# Patient Record
Sex: FEMALE | Race: WHITE | Employment: OTHER | URBAN - METROPOLITAN AREA
[De-identification: names, ages, dates, MRNs, and addresses within clinical notes are randomized per-mention and may not be internally consistent; named-entity substitution may affect disease eponyms.]

---

## 2017-06-28 ENCOUNTER — HOSPITAL ENCOUNTER (OUTPATIENT)
Dept: PHYSICAL THERAPY | Age: 62
Discharge: HOME | End: 2017-06-30
Attending: FAMILY MEDICINE | Admitting: FAMILY MEDICINE

## 2017-07-01 ENCOUNTER — HOSPITAL ENCOUNTER (OUTPATIENT)
Dept: OTHER | Age: 62
Discharge: HOME | End: 2017-07-31
Attending: FAMILY MEDICINE | Admitting: FAMILY MEDICINE

## 2017-07-05 ENCOUNTER — HOSPITAL ENCOUNTER (OUTPATIENT)
Dept: PHYSICAL THERAPY | Age: 62
Discharge: HOME | End: 2017-07-05
Admitting: FAMILY MEDICINE

## 2017-07-21 ENCOUNTER — HOSPITAL ENCOUNTER (OUTPATIENT)
Dept: PHYSICAL THERAPY | Age: 62
Discharge: HOME | End: 2017-07-21
Admitting: FAMILY MEDICINE

## 2017-08-01 ENCOUNTER — HOSPITAL ENCOUNTER (OUTPATIENT)
Dept: OTHER | Age: 62
Discharge: HOME | End: 2017-08-04
Attending: FAMILY MEDICINE | Admitting: FAMILY MEDICINE

## 2017-08-04 ENCOUNTER — HOSPITAL ENCOUNTER (OUTPATIENT)
Dept: PHYSICAL THERAPY | Age: 62
Discharge: HOME | End: 2017-08-04
Admitting: FAMILY MEDICINE

## 2018-11-02 ENCOUNTER — HOSPITAL ENCOUNTER (OUTPATIENT)
Dept: NEUROLOGY | Age: 63
Discharge: HOME | End: 2018-11-02
Payer: COMMERCIAL

## 2018-11-02 PROCEDURE — 95861 NEEDLE EMG 2 EXTREMITIES: CPT

## 2020-09-28 PROBLEM — R60.0 LOCALIZED EDEMA: Status: ACTIVE | Noted: 2020-09-28

## 2020-09-28 PROBLEM — N18.30 CKD (CHRONIC KIDNEY DISEASE), STAGE III (HCC): Status: ACTIVE | Noted: 2020-09-28

## 2020-12-24 PROBLEM — N17.8 OTHER ACUTE KIDNEY FAILURE (HCC): Status: ACTIVE | Noted: 2020-12-24

## 2020-12-24 PROBLEM — F34.1 DYSTHYMIC DISORDER: Status: ACTIVE | Noted: 2020-12-24

## 2021-01-25 PROBLEM — N18.30 CKD (CHRONIC KIDNEY DISEASE), STAGE III (HCC): Status: RESOLVED | Noted: 2020-09-28 | Resolved: 2021-01-25

## 2021-01-25 PROBLEM — N18.31 STAGE 3A CHRONIC KIDNEY DISEASE (HCC): Status: ACTIVE | Noted: 2020-09-28

## 2021-06-01 ENCOUNTER — HOSPITAL ENCOUNTER (OUTPATIENT)
Dept: GENERAL RADIOLOGY | Age: 66
Discharge: HOME | End: 2021-06-01
Payer: MEDICARE

## 2021-06-01 DIAGNOSIS — E13.10 DIABETIC KETOSIS WITHOUT COMA (HCC): ICD-10-CM

## 2021-06-01 PROCEDURE — 74220 X-RAY XM ESOPHAGUS 1CNTRST: CPT

## 2021-08-05 ENCOUNTER — INITIAL CONSULT (OUTPATIENT)
Dept: PULMONOLOGY | Age: 66
End: 2021-08-05
Payer: MEDICARE

## 2021-08-05 VITALS
OXYGEN SATURATION: 96 % | WEIGHT: 293 LBS | HEIGHT: 66 IN | BODY MASS INDEX: 47.09 KG/M2 | DIASTOLIC BLOOD PRESSURE: 80 MMHG | SYSTOLIC BLOOD PRESSURE: 128 MMHG | HEART RATE: 88 BPM

## 2021-08-05 DIAGNOSIS — R06.02 SOBOE (SHORTNESS OF BREATH ON EXERTION): Primary | ICD-10-CM

## 2021-08-05 DIAGNOSIS — Z00.00 HEALTHCARE MAINTENANCE: ICD-10-CM

## 2021-08-05 DIAGNOSIS — E66.01 MORBID OBESITY (HCC): ICD-10-CM

## 2021-08-05 DIAGNOSIS — R05.9 COUGH: ICD-10-CM

## 2021-08-05 PROCEDURE — 99204 OFFICE O/P NEW MOD 45 MIN: CPT | Performed by: NURSE PRACTITIONER

## 2021-08-05 ASSESSMENT — ASTHMA QUESTIONNAIRES
QUESTION_1 LAST FOUR WEEKS HOW MUCH OF THE TIME DID YOUR ASTHMA KEEP YOU FROM GETTING AS MUCH DONE AT WORK, SCHOOL OR AT HOME: 3
QUESTION_4 LAST FOUR WEEKS HOW OFTEN HAVE YOU USED YOUR RESCUE INHALER OR NEBULIZER MEDICATION (SUCH AS ALBUTEROL): 4
QUESTION_2 LAST FOUR WEEKS HOW OFTEN HAVE YOU HAD SHORTNESS OF BREATH: 1
QUESTION_3 LAST FOUR WEEKS HOW OFTEN DID YOUR ASTHMA SYMPTOMS (WHEEZING, COUGHING, SHORTNESS OF BREATH, CHEST TIGHTNESS OR PAIN) WAKE YOU UP AT NIGHT OR EARLIER THAN USUAL IN THE MORNING: 4
ACT_TOTALSCORE: 15
QUESTION_5 LAST FOUR WEEKS HOW WOULD YOU RATE YOUR ASTHMA CONTROL: 3

## 2021-08-05 ASSESSMENT — SLEEP AND FATIGUE QUESTIONNAIRES
HOW LIKELY ARE YOU TO NOD OFF OR FALL ASLEEP WHILE SITTING AND TALKING TO SOMEONE: 0
NECK CIRCUMFERENCE (INCHES): 17
HOW LIKELY ARE YOU TO NOD OFF OR FALL ASLEEP WHILE LYING DOWN TO REST IN THE AFTERNOON WHEN CIRCUMSTANCES PERMIT: 2
HOW LIKELY ARE YOU TO NOD OFF OR FALL ASLEEP WHILE SITTING QUIETLY AFTER LUNCH WITHOUT ALCOHOL: 3
HOW LIKELY ARE YOU TO NOD OFF OR FALL ASLEEP WHILE SITTING INACTIVE IN A PUBLIC PLACE: 0
ESS TOTAL SCORE: 10
HOW LIKELY ARE YOU TO NOD OFF OR FALL ASLEEP WHILE SITTING AND READING: 2
HOW LIKELY ARE YOU TO NOD OFF OR FALL ASLEEP WHILE WATCHING TV: 2
HOW LIKELY ARE YOU TO NOD OFF OR FALL ASLEEP WHEN YOU ARE A PASSENGER IN A CAR FOR AN HOUR WITHOUT A BREAK: 1
HOW LIKELY ARE YOU TO NOD OFF OR FALL ASLEEP IN A CAR, WHILE STOPPED FOR A FEW MINUTES IN TRAFFIC: 0

## 2021-08-05 NOTE — PROGRESS NOTES
Subjective:   CHIEF COMPLAINT / HPI:       Ravindra Rossi is a 78 y.o. female with history of asthma, SOB, GERD, CHRIS, presents to pulmonary clinic for evaluation and management of asthma and shortness of breath. Dov Bose states she was told she had asthma many years ago. She states she saw Dr. Avni Torres in January who completed pulmonary function test along with other allergen test and felt that she did not present as asthmatic. She is currently on Symbicort 2 puffs twice a day with spacer. She had tried Advair discus and Breo in the past which she failed on those therapies. She received a sample of Trelegy from Dr. Avni Torres and stated that she felt she had the most relief with Trelegy. She states that she was using her albuterol rescue inhaler 1 - 2 times a week last fall, but has been using several times a day for the past 3 - 4 months. She also has been prescribed Flonase which she uses 1 spray each naris daily. She has had no recent chest x-ray or CT of chest.    She states she has SOB with walking up stairs and inclines. She states on rare occasion she may have a wheeze. She will have occasional cough, her cough sometimes is worsened. She is currently on Prilosec for reflux along with Pepcid. Dov Bose states they are practicing social distancing, wearing a mask when out in public, washing hands frequently or using hand . Denies any fever, chills, malaise, change in sensation of taste or smell, headache or lightheadedness. Denies any known contacts with persons with respiratory infection, positive for coronavirus or under investigation for possible coronavirus exposure.     Influenza immunization: 10/21/2020  Pneumococcal immunization:Prenvar 13 8/28/2019, 9/8/2017;  PPV23 on 9/18/2017  COVID-19 immunization: 3/11/2021, 2/10/2021 - [facility]  Smoking history: never smoked, heavy 2nd hand exposure going up,  PCP: Destin Solano MD    Past Medical History:  Past Medical History:   Diagnosis Date    Arthritis     Asthma     Diabetes mellitus ([facility] [address]     Hypertension        Current Medications:      Current Outpatient Medications:     buPROPion (WELLBUTRIN XL) 150 MG extended release tablet, Take 150 mg by mouth every morning, Disp: , Rfl:     verapamil (VERELAN) 240 MG extended release capsule, Take 1 capsule by mouth nightly, Disp: 30 capsule, Rfl: 5    furosemide (LASIX) 40 MG tablet, Take 1 tablet by mouth 2 times daily (Patient taking differently: Take 60 mg by mouth daily ), Disp: 180 tablet, Rfl: 3    hydrALAZINE (APRESOLINE) 25 MG tablet, Take 25 mg by mouth 2 times daily, Disp: , Rfl:     valsartan (DIOVAN) 320 MG tablet, Take 320 mg by mouth daily, Disp: , Rfl:     FLUoxetine (PROZAC) 20 MG capsule, Take 20 mg by mouth daily, Disp: , Rfl:     Meloxicam 15 MG TBDP, Take 1 tablet by mouth every other day, Disp: , Rfl:     MULTIPLE VITAMIN PO, Take by mouth, Disp: , Rfl:     Cholecalciferol (VITAMIN D) 50 MCG (2000 UT) CAPS capsule, Take by mouth, Disp: , Rfl:     Ascorbic Acid (VITAMIN C) 250 MG tablet, Take 250 mg by mouth daily, Disp: , Rfl:     vitamin E 1000 units capsule, Take 1,000 Units by mouth daily, Disp: , Rfl:     melatonin 10 MG CAPS capsule, Take 10 mg by mouth nightly, Disp: , Rfl:     aspirin 81 MG chewable tablet, Take 1 tablet by mouth daily, Disp: 30 tablet, Rfl: 3    SITagliptin (JANUVIA) 100 MG tablet, Take 100 mg by mouth daily, Disp: , Rfl:     allopurinol (ZYLOPRIM) 100 MG tablet, Take 200 mg by mouth daily , Disp: , Rfl:     omeprazole (PRILOSEC) 40 MG delayed release capsule, Take 40 mg by mouth daily , Disp: , Rfl:     traMADol (ULTRAM) 50 MG tablet, Take 100 mg by mouth every 8 hours as needed for Pain. , Disp: , Rfl:     levothyroxine (SYNTHROID) 75 MCG tablet, Take 75 mcg by mouth Daily, Disp: , Rfl:     Budesonide-Formoterol Fumarate (SYMBICORT IN), Inhale into the lungs, Disp: , Rfl:     fenofibrate (TRICOR) 48 MG tablet, Take 48 mg by mouth daily, (Medical):  Lack of Transportation (Non-Medical):    Physical Activity:     Days of Exercise per Week:     Minutes of Exercise per Session:    Stress:     Feeling of Stress :    Social Connections:     Frequency of Communication with Friends and Family:     Frequency of Social Gatherings with Friends and Family:     Attends [employer] Services:     Active Member of Clubs or Organizations:     Attends Club or Organization Meetings:     Marital Status:    Intimate Partner Violence:     Fear of Current or Ex-Partner:     Emotionally Abused:     Physically Abused:     Sexually Abused:        Family History:    Family History   Problem Relation Age of Onset    Heart Disease Mother     COPD Mother     Diabetes Mother     Kidney Disease Mother     Hypertension Mother     Heart Disease Father     COPD Father     Cancer Father     High Cholesterol Father     Hypertension Father     Diabetes Maternal Grandmother     Diabetes Maternal Grandfather     Kidney Disease Paternal Grandmother     Kidney Disease Paternal Grandfather          REVIEW OF SYSTEMS:    CONSTITUTIONAL:  Negative for fevers, chills, diaphoresis, activity change, appetite change, night sweats, unexpected weight change.    HEENT:  Negative for hearing loss,  sinus pressure, nasal congestion, epistaxis and snoring  RESPIRATORY: Positive shortness of breath, positive cough-nonproductive, positive rare wheeze  CARDIOVASCULAR:  Negative for chest pain, palpitations, exertional chest pressure/discomfort, edema, syncope  GASTROINTESTINAL: Negative for nausea, vomiting, diarrhea, constipation, blood in stool and abdominal pain  GENITOURINARY:  Negative for frequency, dysuria and hematuria  HEMATOLOGIC/LYMPHATIC:  Negative for easy bruising, bleeding and lymphadenopathy  ALLERGIC/IMMUNOLOGIC:  Negative for recurrent infections, angioedema, anaphylaxis and drug reaction  MUSCULOSKELETAL:  Negative for  pain, joint swelling, decreased range of motion and muscle weakness  NEURO: Negative for headache, AMS, decrease sensations  SKIN: Negative for rashes or lesions      Objective:   VITALS:   Vitals:    08/05/21 1417   BP: 128/80   Pulse: 88   SpO2: 96%   Weight: (!) 301 lb (136.5 kg)   Height: 5' 6\" (1.676 m)     Neck circumference: 17  Inches  [place] - Total score: 10  MALLAMPATI: 3  Body mass index is 48.58 kg/m². PHYSICAL EXAM:    CONSTITUTIONAL:  awake, alert, cooperative, no apparent distress, and appears stated age  HEENT:  Supple and nontender,  trachea midline, no adenopathy, thyroid normal, no JVD, no wheezing or stridor over neck  CHEST: Chest expansion equal and symmetrical, no intercostal retraction, no increased work of breathing  LUNGS: Bilateral breath sounds clear and equal to auscultation, good air movement, no use of accessory muscles to support respiratory effort. No forced expiratory wheeze, no rales, no rhonchi, no cough noted during assessment. O2 sat at rest on room air is 96%  CARDIOVASCULAR: Normal S1 and S2 , no murmurs or gallops ,no pericardial rubs  ABDOMEN:  normal bowel sounds, non-distended and no masses palpated, and no tenderness to palpation. LYMPHADENOPATHY:  no axillary or supraclavicular adenopathy. No cervical adenopathy  EXTREMITIES: No edema, no inflammation, no tenderness, no clubbing of digits  NEURO: Oriented X 3, No focal deficits  SKIN: warm and dry      RADIOLOGY:  No recent chest CT or chest x-ray to review    PFT:   Spirometry was completed at Dr. Victory Pallas office I will request results from his office to review    Assessment      1. SOBOE (shortness of breath on exertion)    2. Cough    3. Morbid obesity ([address]    4. Healthcare maintenance        Plan:  Keke shortness of breath with activity could be multifactorial, she has been seen by Dr. Sonia Bauer who does not feel that she is asthmatic. He did complete allergy testing she stated and she was not allergic to tested items.   She has been sent to this office note. Any errors should be brought immediately to my attention for correction. All efforts were made to ensure that this office note is accurate.        Electronically signed by JIMBO Ruelas CNP on 8/5/2021 at 8:18 PM

## 2021-11-11 ENCOUNTER — OFFICE VISIT (OUTPATIENT)
Dept: PULMONOLOGY | Age: 66
End: 2021-11-11
Payer: MEDICARE

## 2021-11-11 VITALS
DIASTOLIC BLOOD PRESSURE: 70 MMHG | OXYGEN SATURATION: 98 % | WEIGHT: 293 LBS | SYSTOLIC BLOOD PRESSURE: 124 MMHG | HEART RATE: 75 BPM | HEIGHT: 66 IN | BODY MASS INDEX: 47.09 KG/M2

## 2021-11-11 DIAGNOSIS — E66.01 MORBID OBESITY (HCC): ICD-10-CM

## 2021-11-11 DIAGNOSIS — Z00.00 HEALTHCARE MAINTENANCE: ICD-10-CM

## 2021-11-11 DIAGNOSIS — J98.4 RESTRICTIVE LUNG DISEASE SECONDARY TO OBESITY: ICD-10-CM

## 2021-11-11 DIAGNOSIS — E66.9 RESTRICTIVE LUNG DISEASE SECONDARY TO OBESITY: ICD-10-CM

## 2021-11-11 DIAGNOSIS — R06.02 SOBOE (SHORTNESS OF BREATH ON EXERTION): Primary | ICD-10-CM

## 2021-11-11 PROCEDURE — 99214 OFFICE O/P EST MOD 30 MIN: CPT | Performed by: NURSE PRACTITIONER

## 2021-11-11 RX ORDER — BUDESONIDE, GLYCOPYRROLATE, AND FORMOTEROL FUMARATE 160; 9; 4.8 UG/1; UG/1; UG/1
2 AEROSOL, METERED RESPIRATORY (INHALATION) 2 TIMES DAILY
Qty: 1 EACH | Refills: 5 | Status: SHIPPED | OUTPATIENT
Start: 2021-11-11 | End: 2021-12-11

## 2021-11-11 NOTE — PROGRESS NOTES
Pulmonary Clinic Office Follow up     Doug Bey is a 78 y.o. female with history of asthma, shortness of breath, GERD, CHRIS, presents today to the pulmonary clinic for 3-month follow up. Christina Ortiz states she has not required hospitalization, visits to the emergency room, walk-in clinic, urgent care for respiratory related illness since last visit when seen in consult on 8/5/2021. Since Keke's consult we have received notes from Dr. Luciano Vogt whom she saw on 12/31/2020 who recommended at that time that she have a complete pulmonary function test, CT chest.  She did complete a office spirometry at his office which demonstrated questionable restrictive lung disease. He started her on Trelegy and gave her a sample however her insurance would not cover Trelegy. She is currently on Symbicort using it twice a day along with albuterol rescue inhaler which she states she is using 2-3 times a week. She states when she was on the Trelegy she was not requiring her albuterol rescue inhaler at all and did feel that she had significant improvement in her overall lung function. She is also currently on Flonase which she uses daily 1 spray each naris. She states her shortness of breath has improved slightly but she also admits to not being active. She is unable to walk stairs or inclines due to physical restrictions. She is in a electric mobile wheel unit which he states she does not use in her home but she has limited distance that she needs to walk in her own home are very limited distance. Keke's weight is a contributing factor to her shortness of breath and possible restrictive lung disease. She has lost 3 pounds since I saw her 3 months ago. We have discussed the importance of weight loss and physical activity even though she is limited in her walking ability.   We have also discussed the importance of lung exercises and she is to perform lung exercises as demonstrated in the office 4 times a day. Cici Carpenter states they are practicing social distancing, wearing a mask when out in public, washing hands frequently or using hand . Denies any fever, chills, malaise, change in sensation of taste or smell, headache or lightheadedness. Denies any known contacts with persons with respiratory infection, positive for coronavirus or under investigation for possible coronavirus exposure. Influenza immunization: 10/14/2021  Pneumococcal immunization: Prevnar 13 on 8/28/2019 and 9/8/2017, PPV 23 on 9/18/2017  COVID-19 immunization:  11/5/2021, 3/11/2021, 2/10/2020- Moderna  Smoking history: Non-smoker, nonvapor, none use of smokeless tobacco products  PCP: Danna Estrada MD    Past Medical History:  Past Medical History:   Diagnosis Date    Arthritis     Asthma     Diabetes mellitus ([facility] [address]     Hypertension        Current medications and allergies have been reviewed    Social and family history unchanged from initial consult      REVIEW OF SYSTEMS:    CONSTITUTIONAL:  negative for fevers, chills, diaphoresis, activity change, appetite change, night sweats and unexpected weight change.    HEENT:  Negative for hearing loss, sinus pressure, nasal congestion, epistaxis, snoring  RESPIRATORY: Positive shortness of breath on exertion, negative cough, negative wheeze  CARDIOVASCULAR:  Negative for chest pain, palpitations, exertional chest pressure/discomfort, edema, syncope  GASTROINTESTINAL: Negative for nausea, vomiting, diarrhea, constipation, blood in stool and abdominal pain  GENITOURINARY:  Negative for frequency, dysuria and hematuria  HEMATOLOGIC/LYMPHATIC:  Negative for easy bruising, bleeding and lymphadenopathy  ALLERGIC/IMMUNOLOGIC:  Negative for recurrent infections, angioedema, anaphylaxis and drug reaction  MUSCULOSKELETAL:  Negative for pain, joint swelling, decreased range of motion and muscle weakness  NEURO: Negative for headache, AMS, decrease sensations  SKIN: Negative for rashes or lesions      Physical Exam:  /70   Pulse 75   Ht 5' 6\" (1.676 m)   Wt 298 lb 6.4 oz (135.4 kg)   SpO2 98%   BMI 48.16 kg/m²    Body mass index is 48.16 kg/m². Constitutional:  She appears well developed and well-nourished. Neck:  Supple, no palpable lymphadenopathy, no JVD  Cardiovascular:  S1, S2 Normal, Regular rhythm, no murmurs or gallops, no pericardial  rubs. Pulmonary:  Bilateral breath sounds clear and equal to auscultation, good air movement, no use of accessory muscles to support respiratory effort. No forced expiratory wheeze, no rales, no rhonchi, no cough noted during assessment. O2 sat at rest on room air is 98%   Abdomen: No epigastric pain, no distention, + bowel sounds   Extremities: No edema, no calf tenderness, no clubbing of digits  Neurologic:  Awake and alert, no focal deficits  Skin: warm and dry    Radiology:   CT chest scan pending    PFT:   Completed at Dr. Omar Monroe office on 12/31/2020 demonstrated possible restrictive activity, FENO was 21. PFT pending    ASSESSMENT/PLAN:  1. SOBOE (shortness of breath on exertion)    2. Restrictive lung disease secondary to obesity    3. Morbid obesity ([address]    4.  Healthcare maintenance      --Continue Breztri, denies need for refills, is aware that depending upon work-up findings bronchodilator therapy may change as triple therapy is not needed for restrictive lung disease however if she has an obstructive component depending upon lung function we may continue current bronchodilator therapy  --Continue albuterol rescue inhaler as needed, denies need for refills at this time  --Pulmonary function test to be completed in next few weeks  --Covid prescreening test will be completed prior to pulmonary function test  --CT chest in next few weeks  --While she has been vaccinated including her booster at this time for COVID-19, I strongly recommend that she continue Coronavirus precaution including: Wearing mask when in public and when in contact with persons who have not been immunized, social distancing when needing to be in public, handwashing practice, wiping items touched in public such as gas pumps, door handles, shopping carts, etc.  --Recommend yearly flu vaccination, is up-to-date for this season  --Recommend Pneumovax vaccination, is up-to-date at this time  --I will continue to follow in pulmonary clinic  Return in about 3 months (around 2/11/2022) for 3-month Doxy to discuss CT, PFT, 6-month follow-up in office. This dictation was performed with a verbal recognition program and it was checked for errors. It is possible that there are still dictated errors within this office note. Any errors should be brought immediately to my attention for correction. All efforts were made to ensure that this office note is accurate.

## 2021-12-28 DIAGNOSIS — Z01.811 PREOP PULMONARY/RESPIRATORY EXAM: Primary | ICD-10-CM

## 2022-01-21 ENCOUNTER — HOSPITAL ENCOUNTER (OUTPATIENT)
Dept: LAB | Age: 67
Discharge: HOME | End: 2022-01-21
Payer: MEDICARE

## 2022-01-21 LAB
SARS-COV-2: DETECTED
SOURCE: ABNORMAL

## 2022-01-21 PROCEDURE — U0003 INFECTIOUS AGENT DETECTION BY NUCLEIC ACID (DNA OR RNA); SEVERE ACUTE RESPIRATORY SYNDROME CORONAVIRUS 2 (SARS-COV-2) (CORONAVIRUS DISEASE [COVID-19]), AMPLIFIED PROBE TECHNIQUE, MAKING USE OF HIGH THROUGHPUT TECHNOLOGIES AS DESCRIBED BY CMS-2020-01-R: HCPCS

## 2022-01-21 PROCEDURE — 36415 COLL VENOUS BLD VENIPUNCTURE: CPT

## 2022-01-21 PROCEDURE — U0005 INFEC AGEN DETEC AMPLI PROBE: HCPCS

## 2022-01-24 ENCOUNTER — TELEPHONE (OUTPATIENT)
Dept: PULMONOLOGY | Age: 67
End: 2022-01-24

## 2022-01-24 NOTE — TELEPHONE ENCOUNTER
Patient called and advised that she is positive and needs to [place] pft I advised she would speak to cs.

## 2022-01-25 DIAGNOSIS — Z01.811 PREOP PULMONARY/RESPIRATORY EXAM: Primary | ICD-10-CM

## 2022-01-26 ENCOUNTER — HOSPITAL ENCOUNTER (OUTPATIENT)
Dept: PULMONOLOGY | Age: 67
Discharge: HOME | End: 2022-01-26

## 2022-02-16 ENCOUNTER — HOSPITAL ENCOUNTER (OUTPATIENT)
Dept: LAB | Age: 67
Discharge: HOME | End: 2022-02-16
Payer: MEDICARE

## 2022-02-16 LAB
SARS-COV-2: NOT DETECTED
SOURCE: NORMAL

## 2022-02-16 PROCEDURE — U0005 INFEC AGEN DETEC AMPLI PROBE: HCPCS

## 2022-02-16 PROCEDURE — U0003 INFECTIOUS AGENT DETECTION BY NUCLEIC ACID (DNA OR RNA); SEVERE ACUTE RESPIRATORY SYNDROME CORONAVIRUS 2 (SARS-COV-2) (CORONAVIRUS DISEASE [COVID-19]), AMPLIFIED PROBE TECHNIQUE, MAKING USE OF HIGH THROUGHPUT TECHNOLOGIES AS DESCRIBED BY CMS-2020-01-R: HCPCS

## 2022-02-21 ENCOUNTER — HOSPITAL ENCOUNTER (OUTPATIENT)
Dept: PULMONOLOGY | Age: 67
Discharge: HOME | End: 2022-02-21
Payer: MEDICARE

## 2022-02-21 DIAGNOSIS — R06.02 SOBOE (SHORTNESS OF BREATH ON EXERTION): ICD-10-CM

## 2022-02-21 LAB
DLCO %PRED: 81 %
DLCO PRED: NORMAL
DLCO/VA %PRED: NORMAL
DLCO/VA PRED: NORMAL
DLCO/VA: NORMAL
DLCO: NORMAL
EXPIRATORY TIME-POST: NORMAL
EXPIRATORY TIME: NORMAL
FEF 25-75% %CHNG: NORMAL
FEF 25-75% %PRED-POST: NORMAL
FEF 25-75% %PRED-PRE: NORMAL
FEF 25-75% PRED: NORMAL
FEF 25-75%-POST: NORMAL
FEF 25-75%-PRE: NORMAL
FEV1 %PRED-POST: 90 %
FEV1 %PRED-PRE: 87 %
FEV1 PRED: NORMAL
FEV1-POST: NORMAL
FEV1-PRE: NORMAL
FEV1/FVC %PRED-POST: NORMAL
FEV1/FVC %PRED-PRE: NORMAL
FEV1/FVC PRED: NORMAL
FEV1/FVC-POST: 102 %
FEV1/FVC-PRE: 102 %
FVC %PRED-POST: NORMAL
FVC %PRED-PRE: NORMAL
FVC PRED: NORMAL
FVC-POST: NORMAL
FVC-PRE: NORMAL
GAW %PRED: NORMAL
GAW PRED: NORMAL
GAW: NORMAL
IC %PRED: NORMAL
IC PRED: NORMAL
IC: NORMAL
MEP: NORMAL
MIP: NORMAL
MVV %PRED-PRE: NORMAL
MVV PRED: NORMAL
MVV-PRE: NORMAL
PEF %PRED-POST: NORMAL
PEF %PRED-PRE: NORMAL
PEF PRED: NORMAL
PEF%CHNG: NORMAL
PEF-POST: NORMAL
PEF-PRE: NORMAL
RAW %PRED: NORMAL
RAW PRED: NORMAL
RAW: NORMAL
RV %PRED: NORMAL
RV PRED: NORMAL
RV: NORMAL
SVC %PRED: NORMAL
SVC PRED: NORMAL
SVC: NORMAL
TLC %PRED: 86 %
TLC PRED: NORMAL
TLC: NORMAL
VA %PRED: NORMAL
VA PRED: NORMAL
VA: NORMAL
VTG %PRED: NORMAL
VTG PRED: NORMAL
VTG: NORMAL

## 2022-02-21 PROCEDURE — 94727 GAS DIL/WSHOT DETER LNG VOL: CPT

## 2022-02-21 PROCEDURE — 94060 EVALUATION OF WHEEZING: CPT

## 2022-02-21 PROCEDURE — 94729 DIFFUSING CAPACITY: CPT

## 2022-02-21 ASSESSMENT — PULMONARY FUNCTION TESTS
FEV1_PERCENT_PREDICTED_POST: 90
FEV1/FVC_PRE: 102
FEV1_PERCENT_PREDICTED_PRE: 87
FEV1/FVC_POST: 102

## 2022-03-14 LAB
CREATININE URINE: 25.1 MG/DL
PROT/CREAT RATIO, UR: NORMAL RATIO (ref 15–220)
TOTAL PROTEIN, URINE: <4 MG/DL

## 2022-03-15 PROBLEM — E11.22 TYPE 2 DIABETES MELLITUS WITH STAGE 3B CHRONIC KIDNEY DISEASE (HCC): Status: ACTIVE | Noted: 2022-03-15

## 2022-03-15 PROBLEM — N18.32 TYPE 2 DIABETES MELLITUS WITH STAGE 3B CHRONIC KIDNEY DISEASE (HCC): Status: ACTIVE | Noted: 2022-03-15

## 2022-03-15 PROBLEM — N18.31 STAGE 3A CHRONIC KIDNEY DISEASE (HCC): Status: RESOLVED | Noted: 2020-09-28 | Resolved: 2022-03-15

## 2022-03-15 LAB
ALBUMIN: 4.2 G/DL (ref 3.5–5.2)
BUN BLDV-MCNC: 25 MG/DL (ref 3–29)
BUN/CREAT BLD: 18 (ref 7–25)
CALCIUM SERPL-MCNC: 9.8 MG/DL (ref 8.5–10.5)
CHLORIDE BLD-SCNC: 98 MEQ/L (ref 96–110)
CO2: 25 MEQ/L (ref 19–32)
CREAT SERPL-MCNC: 1.4 MG/DL (ref 0.5–1.2)
FASTING STATUS: ABNORMAL
GLOMERULAR FILTRATION RATE: 41 MLS/MIN/1.73M2
GLUCOSE BLD-MCNC: 248 MG/DL (ref 70–99)
PHOSPHORUS: 3.9 MG/DL (ref 2.1–4.3)
POTASSIUM SERPL-SCNC: 4.5 MEQ/L (ref 3.4–5.3)
SODIUM BLD-SCNC: 138 MEQ/L (ref 135–148)

## 2023-01-12 ENCOUNTER — OFFICE VISIT (OUTPATIENT)
Dept: PULMONOLOGY | Age: 68
End: 2023-01-12

## 2023-01-12 VITALS
OXYGEN SATURATION: 98 % | HEIGHT: 66 IN | BODY MASS INDEX: 47.09 KG/M2 | SYSTOLIC BLOOD PRESSURE: 126 MMHG | DIASTOLIC BLOOD PRESSURE: 74 MMHG | HEART RATE: 86 BPM | RESPIRATION RATE: 16 BRPM | WEIGHT: 293 LBS

## 2023-01-12 DIAGNOSIS — J44.9 COPD MIXED TYPE (HCC): Primary | ICD-10-CM

## 2023-01-12 DIAGNOSIS — J44.1 ASTHMA EXACERBATION WITH COPD (CHRONIC OBSTRUCTIVE PULMONARY DISEASE) (HCC): ICD-10-CM

## 2023-01-12 DIAGNOSIS — J45.901 ASTHMA EXACERBATION WITH COPD (CHRONIC OBSTRUCTIVE PULMONARY DISEASE) (HCC): ICD-10-CM

## 2023-01-12 RX ORDER — BUDESONIDE, GLYCOPYRROLATE, AND FORMOTEROL FUMARATE 160; 9; 4.8 UG/1; UG/1; UG/1
160 AEROSOL, METERED RESPIRATORY (INHALATION) 2 TIMES DAILY
Qty: 3 EACH | Refills: 3 | Status: SHIPPED | OUTPATIENT
Start: 2023-01-12 | End: 2023-04-12

## 2023-01-12 ASSESSMENT — ENCOUNTER SYMPTOMS
GASTROINTESTINAL NEGATIVE: 1
SHORTNESS OF BREATH: 1
EYES NEGATIVE: 1

## 2023-01-12 NOTE — PROGRESS NOTES
Eli Stuart (:  1955) is a 75 y.o. female,Established patient, here for evaluation of the following chief complaint(s):  Follow-up and Shortness of Breath      Subjective   SUBJECTIVE/OBJECTIVE:  Eli Stuart is a 75 y.o. female with history of asthma, shortness of breath, GERD, CHRIS, presents today to the pulmonary clinic for follow up to discuss PFT results and to receive a new prescription for [facility] inhaler. Danae Never states that she had asthma attacks in her younger years as a teenager. She would be playing tennis or exercising and then have to stop because she could not breath. She states that she doesn't smoke, but that her father was a heavy smoker and he smoked around her. She has a short haired cat, in which she states that she has not always had a cat, but that her breathing isn't any different with or without the cat. She denies being allergic to most environmental fumes. She remembers going bowling and people would smoke around her. She is a retired teacher. Danae Never stated that she knows her weight is a contributing factor to her shortness of breath and possible restrictive lung disease. We discussed taking two different inhaler that she was prescribed. She states that she was first prescribed Symbicort and that it worked well at first. She was prescribed Breztri when the Symbicort stopped helping her shortness of breath. She ran out of [facility] and had Symbicort on hand. She used the Symbicort because that is all she had at the time. She reports that [facility] works better for her. She reports having bronchospasms and a frequent dry cough. We discussed that she may need that extra bronchodilator support that a muscarinic can give to help reduce bronchospasms and cough. I reordered the [facility]. The flow volume loop tracing appeared to present with mild COPD. We discussed the results of the PFT which demonstrates a normal DLCO with normal spirometry.  We discussed that she possible have exercise induce Asthma with mild COPD in which she states that she has been told that before. Last chest xray was on 08/11/2017. I ordered a chest Xray to compare if any changes in her lungs. XRAY findings: Mild cardiomegaly and central vascular congestion. No overt failure or focal.   infiltrate. She is currently up to date on Covid-19 vaccines and flu vaccine. Shortness of Breath      Review of Systems   Constitutional: Negative. HENT: Negative. Eyes: Negative. Respiratory:  Positive for shortness of breath. Cardiovascular: Negative. Gastrointestinal: Negative. Endocrine: Negative. Genitourinary: Negative. Neurological: Negative. Hematological: Negative. Psychiatric/Behavioral:  Positive for sleep disturbance. Objective   Physical Exam  Vitals and nursing note reviewed. Constitutional:       General: She is awake. Appearance: Normal appearance. She is well-developed and well-groomed. HENT:      Head: Normocephalic. Nose: Nose normal.      Mouth/Throat:      Mouth: Mucous membranes are moist.      Pharynx: Oropharynx is clear. Eyes:      Extraocular Movements: Extraocular movements intact. Pupils: Pupils are equal, round, and reactive to light. Cardiovascular:      Rate and Rhythm: Normal rate and regular rhythm. Pulses: Normal pulses. Heart sounds: Normal heart sounds. Pulmonary:      Effort: Pulmonary effort is normal.      Breath sounds: Normal breath sounds. Abdominal:      Palpations: Abdomen is soft. Musculoskeletal:         General: Normal range of motion. Skin:     General: Skin is warm and dry. Capillary Refill: Capillary refill takes less than 2 seconds. Neurological:      General: No focal deficit present. Mental Status: She is alert. Psychiatric:         Mood and Affect: Mood normal.         Behavior: Behavior normal. Behavior is cooperative. Thought Content:  Thought content normal. Judgment: Judgment normal.         ASSESSMENT/PLAN:  1. COPD mixed type ([facility] [address]  -     Budeson-Glycopyrrol-Formoterol (BREZTRI AEROSPHERE) 160-9-4.8 MCG/ACT AERO; Inhale 160 mcg into the lungs in the morning and at bedtime, Disp-3 each, R-3Normal  -     XR CHEST (2 VW); Future  2. Asthma exacerbation with COPD (chronic obstructive pulmonary disease) ([facility] [address]    --Breztri reordered. Sabi Dahl will stop taking Symbicort and continue to use Breztri. She will call the clinic when refills are needed. --Continue wearing CPAP. --Continue to use Albuterol rescue inhaler when needed. --Chest Xray ordered     Vital Signs   /74   Pulse 86   Resp 16   Ht 5' 6\" (1.676 m)   Wt 295 lb (133.8 kg)   SpO2 98%   BMI 47.61 kg/m²      Return in about 6 months (around 7/12/2023). Sleep Medicine 8/5/2021   Sitting and reading 2   Watching TV 2   Sitting, inactive in a public place (e.g. a theatre or a meeting) 0   As a passenger in a car for an hour without a break 1   Lying down to rest in the afternoon when circumstances permit 2   Sitting and talking to someone 0   Sitting quietly after a lunch without alcohol 3   In a car, while stopped for a few minutes in traffic 0   [place] Sleepiness Score 10   Neck circumference (Inches) 17     Mallampati score 4     Sabi Dahl is currently on CPAP. I discussed with Sabi Dahl to follow up with her DME to get the settings of her CPAP machine. This way she will know what settings to use should she have admission to hospital. Sabi Dahl stated that she knows not to use Symbicort and Breztri together and that she only used the Symbicort because she had some on hand and had ran out of [employer]point. She verbally stated understanding of our discussion. I will make no changes in her treatment at this time. She is in agreement with the plan. I will continue to follow Sabi Dahl in pulmonary clinic. An electronic signature was used to authenticate this note.     --Felipa Garza, JIMBO - CNP

## 2023-07-11 ENCOUNTER — OFFICE VISIT (OUTPATIENT)
Dept: PULMONOLOGY | Age: 68
End: 2023-07-11
Payer: MEDICARE

## 2023-07-11 VITALS
RESPIRATION RATE: 16 BRPM | HEIGHT: 67 IN | SYSTOLIC BLOOD PRESSURE: 110 MMHG | DIASTOLIC BLOOD PRESSURE: 60 MMHG | OXYGEN SATURATION: 99 % | HEART RATE: 77 BPM | BODY MASS INDEX: 45.99 KG/M2 | WEIGHT: 293 LBS

## 2023-07-11 DIAGNOSIS — J44.9 COPD MIXED TYPE (HCC): Primary | ICD-10-CM

## 2023-07-11 DIAGNOSIS — J45.901 ASTHMA EXACERBATION WITH COPD (CHRONIC OBSTRUCTIVE PULMONARY DISEASE) (HCC): ICD-10-CM

## 2023-07-11 DIAGNOSIS — J44.1 ASTHMA EXACERBATION WITH COPD (CHRONIC OBSTRUCTIVE PULMONARY DISEASE) (HCC): ICD-10-CM

## 2023-07-11 PROCEDURE — 3078F DIAST BP <80 MM HG: CPT | Performed by: NURSE PRACTITIONER

## 2023-07-11 PROCEDURE — 3074F SYST BP LT 130 MM HG: CPT | Performed by: NURSE PRACTITIONER

## 2023-07-11 PROCEDURE — 1123F ACP DISCUSS/DSCN MKR DOCD: CPT | Performed by: NURSE PRACTITIONER

## 2023-07-11 PROCEDURE — 99213 OFFICE O/P EST LOW 20 MIN: CPT | Performed by: NURSE PRACTITIONER

## 2023-07-11 ASSESSMENT — COPD QUESTIONNAIRES: COPD: 1

## 2024-04-24 PROBLEM — N18.32 STAGE 3B CHRONIC KIDNEY DISEASE (CKD) (HCC): Status: ACTIVE | Noted: 2024-04-24

## 2024-07-09 ENCOUNTER — OFFICE VISIT (OUTPATIENT)
Dept: PULMONOLOGY | Age: 69
End: 2024-07-09
Payer: MEDICARE

## 2024-07-09 VITALS
HEART RATE: 84 BPM | DIASTOLIC BLOOD PRESSURE: 74 MMHG | BODY MASS INDEX: 44.23 KG/M2 | SYSTOLIC BLOOD PRESSURE: 136 MMHG | OXYGEN SATURATION: 98 % | WEIGHT: 281.8 LBS | HEIGHT: 67 IN

## 2024-07-09 DIAGNOSIS — J45.901 ASTHMA EXACERBATION WITH COPD (CHRONIC OBSTRUCTIVE PULMONARY DISEASE) (HCC): Primary | ICD-10-CM

## 2024-07-09 DIAGNOSIS — J44.1 ASTHMA EXACERBATION WITH COPD (CHRONIC OBSTRUCTIVE PULMONARY DISEASE) (HCC): Primary | ICD-10-CM

## 2024-07-09 PROCEDURE — 3075F SYST BP GE 130 - 139MM HG: CPT | Performed by: NURSE PRACTITIONER

## 2024-07-09 PROCEDURE — 1123F ACP DISCUSS/DSCN MKR DOCD: CPT | Performed by: NURSE PRACTITIONER

## 2024-07-09 PROCEDURE — 3078F DIAST BP <80 MM HG: CPT | Performed by: NURSE PRACTITIONER

## 2024-07-09 PROCEDURE — 99214 OFFICE O/P EST MOD 30 MIN: CPT | Performed by: NURSE PRACTITIONER

## 2024-07-09 RX ORDER — FLUTICASONE FUROATE, UMECLIDINIUM BROMIDE AND VILANTEROL TRIFENATATE 200; 62.5; 25 UG/1; UG/1; UG/1
1 POWDER RESPIRATORY (INHALATION) DAILY
Qty: 1 EACH | Refills: 11 | Status: SHIPPED | OUTPATIENT
Start: 2024-07-09

## 2024-07-09 RX ORDER — ALBUTEROL SULFATE 90 UG/1
2 AEROSOL, METERED RESPIRATORY (INHALATION) EVERY 6 HOURS PRN
Qty: 18 G | Refills: 6 | Status: SHIPPED | OUTPATIENT
Start: 2024-07-09

## 2024-07-09 RX ORDER — ALBUTEROL SULFATE 90 UG/1
2 AEROSOL, METERED RESPIRATORY (INHALATION) EVERY 6 HOURS PRN
COMMUNITY
Start: 2022-12-30 | End: 2024-07-09 | Stop reason: SDUPTHER

## 2024-07-09 RX ORDER — MECLIZINE HYDROCHLORIDE 25 MG/1
12.5-25 TABLET ORAL 3 TIMES DAILY PRN
COMMUNITY
Start: 2023-11-13

## 2024-07-09 NOTE — PROGRESS NOTES
Keke Escalante (:  1955) is a 68 y.o. female,Established patient, here for evaluation of the following chief complaint(s):  Follow-up (1yr f/u need rx for trelegy. )    Subjective   SUBJECTIVE/OBJECTIVE:  Keke Escalante is a 68 y.o. female with history of asthma, shortness of breath, GERD, CHRIS, presents today to the pulmonary clinic for follow up on efficacy of Breztri and medication refills. She states that Breztri is working okay, but insurance stop covering the cost. I am prescribing Trelegy 200 mcg to take once daily. Rinse mouth out. She was seen by her PCP at [facility] on 2024 to r/o pneumonia for symptoms of high fever (102.9). Labs confirmed she actually had an UTI. She had no symptoms other than fever, increased respiratory rate of 44 bpm and high heart rate. Lungs were clear. She states feeling fine. Her wrist watch provided information on her HR and RR days before she developed a fever.     She was treated with Cipro outpatient. Lungs remain clear on auscultation. I reviewed results and image of chest xray from 2024. No acute disease process.     Review of Systems   All other systems reviewed and are negative.     Objective   Physical Exam  Vitals and nursing note reviewed.   Constitutional:       General: She is awake.      Appearance: Normal appearance. She is well-developed and well-groomed. She is morbidly obese.   HENT:      Mouth/Throat:      Mouth: Mucous membranes are moist.      Pharynx: Oropharynx is clear.   Eyes:      Extraocular Movements: Extraocular movements intact.      Conjunctiva/sclera: Conjunctivae normal.      Pupils: Pupils are equal, round, and reactive to light.   Cardiovascular:      Rate and Rhythm: Normal rate and regular rhythm.      Pulses: Normal pulses.      Heart sounds: Normal heart sounds.   Pulmonary:      Effort: Pulmonary effort is normal.      Breath sounds: Normal breath sounds.   Musculoskeletal:         General: Normal range of motion.

## 2025-01-29 DIAGNOSIS — J44.1 ASTHMA EXACERBATION WITH COPD (CHRONIC OBSTRUCTIVE PULMONARY DISEASE) (HCC): ICD-10-CM

## 2025-01-29 RX ORDER — FLUTICASONE FUROATE, UMECLIDINIUM BROMIDE AND VILANTEROL TRIFENATATE 200; 62.5; 25 UG/1; UG/1; UG/1
1 POWDER RESPIRATORY (INHALATION) DAILY
Qty: 3 EACH | Refills: 0 | Status: SHIPPED | OUTPATIENT
Start: 2025-01-29

## 2025-04-18 DIAGNOSIS — J44.1 ASTHMA EXACERBATION WITH COPD (CHRONIC OBSTRUCTIVE PULMONARY DISEASE) (HCC): ICD-10-CM

## 2025-04-21 RX ORDER — FLUTICASONE FUROATE, UMECLIDINIUM BROMIDE AND VILANTEROL TRIFENATATE 200; 62.5; 25 UG/1; UG/1; UG/1
POWDER RESPIRATORY (INHALATION)
Qty: 3 EACH | Refills: 3 | Status: SHIPPED | OUTPATIENT
Start: 2025-04-21

## 2025-07-08 ENCOUNTER — OFFICE VISIT (OUTPATIENT)
Dept: PULMONOLOGY | Age: 70
End: 2025-07-08
Payer: MEDICARE

## 2025-07-08 VITALS
HEART RATE: 88 BPM | BODY MASS INDEX: 44.32 KG/M2 | SYSTOLIC BLOOD PRESSURE: 124 MMHG | HEIGHT: 67 IN | WEIGHT: 282.4 LBS | OXYGEN SATURATION: 96 % | DIASTOLIC BLOOD PRESSURE: 78 MMHG

## 2025-07-08 DIAGNOSIS — J44.1 ASTHMA EXACERBATION WITH COPD (CHRONIC OBSTRUCTIVE PULMONARY DISEASE) (HCC): ICD-10-CM

## 2025-07-08 PROCEDURE — 1159F MED LIST DOCD IN RCRD: CPT | Performed by: NURSE PRACTITIONER

## 2025-07-08 PROCEDURE — 1123F ACP DISCUSS/DSCN MKR DOCD: CPT | Performed by: NURSE PRACTITIONER

## 2025-07-08 PROCEDURE — 3078F DIAST BP <80 MM HG: CPT | Performed by: NURSE PRACTITIONER

## 2025-07-08 PROCEDURE — 99213 OFFICE O/P EST LOW 20 MIN: CPT | Performed by: NURSE PRACTITIONER

## 2025-07-08 PROCEDURE — 1160F RVW MEDS BY RX/DR IN RCRD: CPT | Performed by: NURSE PRACTITIONER

## 2025-07-08 PROCEDURE — 3074F SYST BP LT 130 MM HG: CPT | Performed by: NURSE PRACTITIONER

## 2025-07-08 RX ORDER — FLUTICASONE FUROATE, UMECLIDINIUM BROMIDE AND VILANTEROL TRIFENATATE 200; 62.5; 25 UG/1; UG/1; UG/1
1 POWDER RESPIRATORY (INHALATION) DAILY
Qty: 3 EACH | Refills: 3 | Status: SHIPPED | OUTPATIENT
Start: 2025-07-08

## 2025-07-08 NOTE — PROGRESS NOTES
Keke Escalante (:  1955) is a 69 y.o. female,Established patient, here for evaluation of the following chief complaint(s):  1 Year Follow Up    Subjective   SUBJECTIVE/OBJECTIVE:  Keke Escalante is a 69 y.o. female with history of asthma, shortness of breath, GERD, CHRIS, presents today to the pulmonary clinic for yearly follow up on Trelegy 200 mcg medication refill.     She states that she has been sick a couple times since her last visit in pulmonary clinic. States that she had pretty bad breathing maybe twice. She was seen by her PCP for both episode. PCP was able to tx her symptoms. She was sent for a chest xray to determine if it was apneumia and it was not.  She denies having ED or hospital visits for SOB or asthma exacerbation. States that she is doing well on Trelegy. Breztri did not work well for her. Keke has Asthma with underlying COPD. I recommended to stay on Trelegy 200 mcg so that her asthma symptoms can be better controlled and managed. She agrees with the plan.     Review of Systems   All other systems reviewed and are negative.     Objective   Physical Exam  Vitals and nursing note reviewed.   Constitutional:       General: She is awake.      Appearance: Normal appearance. She is well-developed and well-groomed. She is morbidly obese.   HENT:      Mouth/Throat:      Mouth: Mucous membranes are moist.      Pharynx: Oropharynx is clear.   Eyes:      Extraocular Movements: Extraocular movements intact.      Conjunctiva/sclera: Conjunctivae normal.      Pupils: Pupils are equal, round, and reactive to light.   Cardiovascular:      Rate and Rhythm: Normal rate and regular rhythm.      Pulses: Normal pulses.      Heart sounds: Normal heart sounds.   Pulmonary:      Effort: Pulmonary effort is normal.      Breath sounds: Normal breath sounds.   Musculoskeletal:         General: Normal range of motion.      Cervical back: Normal range of motion and neck supple.   Skin:     General: Skin is warm and  Karissa Waldo